# Patient Record
Sex: MALE | Race: WHITE | NOT HISPANIC OR LATINO | Employment: PART TIME | URBAN - METROPOLITAN AREA
[De-identification: names, ages, dates, MRNs, and addresses within clinical notes are randomized per-mention and may not be internally consistent; named-entity substitution may affect disease eponyms.]

---

## 2019-12-05 ENCOUNTER — OFFICE VISIT (OUTPATIENT)
Dept: FAMILY MEDICINE CLINIC | Facility: CLINIC | Age: 39
End: 2019-12-05

## 2019-12-05 VITALS
RESPIRATION RATE: 16 BRPM | SYSTOLIC BLOOD PRESSURE: 126 MMHG | OXYGEN SATURATION: 98 % | BODY MASS INDEX: 35.42 KG/M2 | HEIGHT: 74 IN | HEART RATE: 76 BPM | DIASTOLIC BLOOD PRESSURE: 72 MMHG | WEIGHT: 276 LBS

## 2019-12-05 DIAGNOSIS — Z13.6 ENCOUNTER FOR LIPID SCREENING FOR CARDIOVASCULAR DISEASE: ICD-10-CM

## 2019-12-05 DIAGNOSIS — G89.29 CHRONIC FOOT PAIN, RIGHT: ICD-10-CM

## 2019-12-05 DIAGNOSIS — Z00.00 ROUTINE HEALTH MAINTENANCE: Primary | ICD-10-CM

## 2019-12-05 DIAGNOSIS — E66.09 CLASS 2 OBESITY DUE TO EXCESS CALORIES WITHOUT SERIOUS COMORBIDITY WITH BODY MASS INDEX (BMI) OF 35.0 TO 35.9 IN ADULT: ICD-10-CM

## 2019-12-05 DIAGNOSIS — Z13.220 ENCOUNTER FOR LIPID SCREENING FOR CARDIOVASCULAR DISEASE: ICD-10-CM

## 2019-12-05 DIAGNOSIS — I83.93 ASYMPTOMATIC VARICOSE VEINS OF BOTH LOWER EXTREMITIES: ICD-10-CM

## 2019-12-05 DIAGNOSIS — Z13.1 SCREENING FOR DIABETES MELLITUS (DM): ICD-10-CM

## 2019-12-05 DIAGNOSIS — M79.671 CHRONIC FOOT PAIN, RIGHT: ICD-10-CM

## 2019-12-05 PROCEDURE — 99385 PREV VISIT NEW AGE 18-39: CPT | Performed by: FAMILY MEDICINE

## 2019-12-05 NOTE — PROGRESS NOTES
Ronald Noe is a 39 y.o. male. Pt is a new pt for the clinic and he is here to establish care and have a physical exam done.     Chief Complaint   Patient presents with   • Annual Exam       HPI         The following portions of the patient's history were reviewed and updated as appropriate: allergies, current medications, past family history, past medical history, past social history, past surgical history and problem list.    Review of Systems   Constitutional: Negative for chills, fatigue, fever and unexpected weight change.   HENT: Negative for sore throat.    Respiratory: Negative for cough, shortness of breath and wheezing.    Cardiovascular: Negative for chest pain, palpitations and leg swelling.   Gastrointestinal: Negative for abdominal distention, abdominal pain, constipation, diarrhea, nausea and vomiting.   Genitourinary: Negative for dysuria.   Musculoskeletal: Negative for arthralgias and myalgias.   Skin: Negative for rash.   Neurological: Negative for dizziness.   Psychiatric/Behavioral: Negative for confusion.     I have reviewed the ROS as documented by the MA. Ronald Johnson MD    Objective  Vitals:    12/05/19 1428   BP: 126/72   Pulse: 76   Resp: 16   SpO2: 98%     Body mass index is 35.44 kg/m².    Physical Exam   Constitutional: He is oriented to person, place, and time. He appears well-developed and well-nourished. No distress.   HENT:   Right Ear: External ear normal.   Left Ear: External ear normal.   Eyes: EOM are normal. Pupils are equal, round, and reactive to light.   Neck: Normal range of motion. Neck supple. No thyromegaly present.   Cardiovascular: Normal rate, regular rhythm, normal heart sounds and intact distal pulses.   No murmur heard.  Pulmonary/Chest: Effort normal and breath sounds normal. No respiratory distress. He has no wheezes.   Abdominal: Soft. Bowel sounds are normal. There is no tenderness. There is no rebound and no guarding.   Musculoskeletal:  Normal range of motion.        Right lower leg: He exhibits edema.        Left lower leg: He exhibits edema.        Right foot: There is bony tenderness and deformity.   Prominence of B proximal 5th metatarsal head, R>>L, pain with movement of the lateral arch   Neurological: He is alert and oriented to person, place, and time. He displays normal reflexes. No sensory deficit. He exhibits normal muscle tone.   Skin: Skin is warm and dry.        Multiple varicosities on B medial thighs   Psychiatric: He has a normal mood and affect. His behavior is normal.   Nursing note and vitals reviewed.      No current outpatient medications on file.  Current outpatient and discharge medications have been reconciled for the patient.  Reviewed by: Ronald Johnson MD      Procedures    Lab Results (most recent)     None                  Ronald was seen today for annual exam.    Diagnoses and all orders for this visit:    Routine health maintenance    Encounter for lipid screening for cardiovascular disease  -     Lipid Panel With LDL / HDL Ratio    Screening for diabetes mellitus (DM)  -     Comprehensive Metabolic Panel    Class 2 obesity due to excess calories without serious comorbidity with body mass index (BMI) of 35.0 to 35.9 in adult  -     Comprehensive Metabolic Panel  -     Lipid Panel With LDL / HDL Ratio    Asymptomatic varicose veins of both lower extremities    Chronic foot pain, right  -     Ambulatory Referral to Podiatry    Orders as above. I'll contact him with results when available. Encouraged to sign up for and utilize [employer]. Referral to podiatry for treatment of foot pain as I think he may benefit from custom shoes and/or insoles. May require surgical intervention. For now will work to get new shoes.     Discussed natural history and typical prognosis for varicose veins. No interventions needed at this time as he's asymptomatic.     Any information loaded into the AVS was placed there by PRATIBHA Hart  MD Elizabeth, and patient was counseled on the same.   Return in about 4 weeks (around 1/2/2020).      PRATIBHA Johnson MD    Prevention counseling performed as below:  Discussed dietary options, information given in AVS.

## 2019-12-05 NOTE — PATIENT INSTRUCTIONS

## 2020-03-09 ENCOUNTER — OFFICE VISIT (OUTPATIENT)
Dept: FAMILY MEDICINE CLINIC | Facility: CLINIC | Age: 40
End: 2020-03-09

## 2020-03-09 VITALS
RESPIRATION RATE: 16 BRPM | BODY MASS INDEX: 35.29 KG/M2 | DIASTOLIC BLOOD PRESSURE: 74 MMHG | WEIGHT: 275 LBS | HEIGHT: 74 IN | OXYGEN SATURATION: 98 % | SYSTOLIC BLOOD PRESSURE: 124 MMHG | HEART RATE: 73 BPM

## 2020-03-09 DIAGNOSIS — E66.09 CLASS 2 OBESITY DUE TO EXCESS CALORIES WITHOUT SERIOUS COMORBIDITY WITH BODY MASS INDEX (BMI) OF 35.0 TO 35.9 IN ADULT: ICD-10-CM

## 2020-03-09 DIAGNOSIS — M54.50 CHRONIC BILATERAL LOW BACK PAIN WITHOUT SCIATICA: Primary | ICD-10-CM

## 2020-03-09 DIAGNOSIS — G89.29 CHRONIC BILATERAL LOW BACK PAIN WITHOUT SCIATICA: Primary | ICD-10-CM

## 2020-03-09 PROCEDURE — 99214 OFFICE O/P EST MOD 30 MIN: CPT | Performed by: FAMILY MEDICINE

## 2020-03-09 RX ORDER — DICLOFENAC SODIUM 75 MG/1
75 TABLET, DELAYED RELEASE ORAL 2 TIMES DAILY
COMMUNITY
End: 2020-05-12

## 2020-03-09 NOTE — PATIENT INSTRUCTIONS
Core Strength Exercises    Core exercises help to build strength in the muscles between your ribs and your hips (abdominal muscles). These muscles help to support your body and keep your spine stable. It is important to maintain strength in your core to prevent injury and pain.  Some activities, such as yoga and Pilates, can help to strengthen core muscles. You can also strengthen core muscles with exercises at home. It is important to talk to your health care provider before you start a new exercise routine.  What are the benefits of core strength exercises?  Core strength exercises can:  · Reduce back pain.  · Help to rebuild strength after a back or spine injury.  · Help to prevent injury during physical activity, especially injuries to the back and knees.  How to do core strength exercises  Repeat these exercises 10-15 times, or until you are tired. Do exercises exactly as told by your health care provider and adjust them as directed. It is normal to feel mild stretching, pulling, tightness, or discomfort as you do these exercises. If you feel any pain while doing these exercises, stop. If your pain continues or gets worse when doing core exercises, contact your health care provider.  You may want to use a padded yoga or exercise mat for strength exercises that are done on the floor.  Bridging    1. Lie on your back on a firm surface with your knees bent and your feet flat on the floor.  2. Raise your hips so that your knees, hips, and shoulders form a straight line together. Keep your abdominal muscles tight.  3. Hold this position for 3-5 seconds.  4. Slowly lower your hips to the starting position.  5. Let your muscles relax completely between repetitions.  Single-leg bridge  1. Lie on your back on a firm surface with your knees bent and your feet flat on the floor.  2. Raise your hips so that your knees, hips, and shoulders form a straight line together. Keep your abdominal muscles tight.  3. Lift one foot  off the floor, then completely straighten that leg.  4. Hold this position for 3-5 seconds.  5. Put the straight leg back down in the bent position.  6. Slowly lower your hips to the starting position.  7. Repeat these steps using your other leg.  Side bridge  1. Lie on your side with your knees bent. Prop yourself up on the elbow that is near the floor.  2. Using your abdominal muscles and your elbow that is on the floor, raise your body off the floor. Raise your hip so that your shoulder, hip, and foot form a straight line together.  3. Hold this position for 10 seconds. Keep your head and neck raised and away from your shoulder (in their normal, neutral position). Keep your abdominal muscles tight.  4. Slowly lower your hip to the starting position.  5. Repeat and try to hold this position longer, working your way up to 30 seconds.  Abdominal crunch  1. Lie on your back on a firm surface. Bend your knees and keep your feet flat on the floor.  2. Cross your arms over your chest.  3. Without bending your neck, tip your chin slightly toward your chest.  4. Tighten your abdominal muscles as you lift your chest just high enough to lift your shoulder blades off of the floor. Do not hold your breath. You can do this with short lifts or long lifts.  5. Slowly return to the starting position.  Bird dog  1. Get on your hands and knees, with your legs shoulder-width apart and your arms under your shoulders. Keep your back straight.  2. Tighten your abdominal muscles.  3. Raise one of your legs off the floor and straighten it. Try to keep it parallel to the floor.  4. Slowly lower your leg to the starting position.  5. Raise one of your arms off the floor and straighten it. Try to keep it parallel to the floor.  6. Slowly lower your arm to the starting position.  7. Repeat with the other arm and leg. If possible, try raising a leg and arm at the same time, on opposite sides of the body. For example, raise your left hand and  your right leg.  Plank  1. Lie on your belly.  2. Prop up your body onto your forearms and your feet, keeping your legs straight. Your body should make a straight line between your shoulders and feet.  3. Hold this position for 10 seconds while keeping your abdominal muscles tight.  4. Lower your body to the starting position.  5. Repeat and try to hold this position longer, working your way up to 30 seconds.  Cross-core strengthening  1. Stand with your feet shoulder-width apart.  2. Hold a ball out in front of you. Keep your arms straight.  3. Tighten your abdominal muscles and slowly rotate at your waist from side to side. Keep your feet flat.  4. Once you are comfortable, try repeating this exercise with a heavier ball.  Top core strengthening  1. Stand about 18 inches (46 cm) in front of a wall, with your back to the wall.  2. Keep your feet flat and shoulder-width apart.  3. Tighten your abdominal muscles.  4. Bend your hips and knees.  5. Slowly reach between your legs to touch the wall behind you.  6. Slowly stand back up.  7. Raise your arms over your head and reach behind you.  8. Return to the starting position.  General tips  · Do not do any exercises that cause pain. If you have pain while exercising, talk to your health care provider.  · Always stretch before and after doing these exercises. This can help prevent injury.  · Maintain a healthy weight. Ask your health care provider what weight is healthy for you.  Contact a health care provider if:  · You have back pain that gets worse or does not go away.  · You feel pain while doing core strength exercises.  Get help right away if:  · You have severe pain that does not get better with medicine.  Summary  · Core exercises help to build strength in the muscles between your ribs and your waist.  · Core muscles help to support your body and keep your spine stable.  · Some activities, such as yoga and Pilates, can help to strengthen core muscles.  · Core  strength exercises can help back pain and can prevent injury.  · If you feel any pain while doing core strength exercises, stop.  This information is not intended to replace advice given to you by your health care provider. Make sure you discuss any questions you have with your health care provider.  Document Released: 05/09/2018 Document Revised: 05/09/2018 Document Reviewed: 05/09/2018  [employer] Interactive Patient Education © 2020 [employer]

## 2020-03-09 NOTE — PROGRESS NOTES
Ronald Noe is a 39 y.o. male. Pt is here for feet and back pain.     Chief Complaint   Patient presents with   • Back Pain       HPI     Here with chronic low back pain has been getting progressively worse.  Cannot recall any inciting event, though thinks is related to childcare or housework.  Is a deep pain in the middle of his back, no radiation into his legs.  Bothersome if he has been standing for any length of time, worse with some movements.  Believes it is gotten somewhat worse with weight gain.  Has never tried any formal physical therapy.    Otherwise doing well.  Working on his diet and exercise regimen.  Hopeful to lose some weight and get into better shape overall.    The following portions of the patient's history were reviewed and updated as appropriate: allergies, current medications, past family history, past medical history, past social history, past surgical history and problem list.    Review of Systems   Constitutional: Negative for activity change and fatigue.   Musculoskeletal: Positive for arthralgias, back pain and myalgias. Negative for gait problem, neck pain and neck stiffness.   Psychiatric/Behavioral: The patient is not nervous/anxious.      I have reviewed and confirmed the ROS as documented by the MA. Ronald Johnson MD    Objective  Vitals:    03/09/20 1446   BP: 124/74   Pulse: 73   Resp: 16   SpO2: 98%     Body mass index is 35.31 kg/m².    Physical Exam   Constitutional: He appears well-developed and well-nourished.   Musculoskeletal:        Lumbar back: He exhibits tenderness and spasm. He exhibits no swelling and no deformity.   Nursing note and vitals reviewed.        Current Outpatient Medications:   •  diclofenac (VOLTAREN) 75 MG EC tablet, Take 75 mg by mouth 2 (Two) Times a Day., Disp: , Rfl:   Current outpatient and discharge medications have been reconciled for the patient.  Reviewed by: Ronald Johnson MD      Procedures    Lab Results (most  recent)     None                  Ronald was seen today for back pain.    Diagnoses and all orders for this visit:    Chronic bilateral low back pain without sciatica    Class 2 obesity due to excess calories without serious comorbidity with body mass index (BMI) of 35.0 to 35.9 in adult    Discussed some low back exercises and the likely etiology of his back pain.  I do believe this is related to musculoskeletal spasm, likely due to generalized weakness.  Discussed the importance of core strengthening to help with trunk stability and to get his back rest.  Information provided in his AVS.    Encouraged to continue working on diet and lifestyle.  I do think that any efforts towards weight loss will be helpful with his back pain.    Any information loaded into the AVS was placed there by PRATIBHA Johnson MD, and patient was counseled on the same.   Return in about 3 months (around 6/9/2020), or if symptoms worsen or fail to improve.      PRATIBHA Johnson MD    15 minutes was spent of this 25 minute visit in direct face to face counseling and coordination of care regarding:

## 2020-07-30 ENCOUNTER — OFFICE VISIT (OUTPATIENT)
Dept: FAMILY MEDICINE CLINIC | Facility: CLINIC | Age: 40
End: 2020-07-30

## 2020-07-30 VITALS
SYSTOLIC BLOOD PRESSURE: 130 MMHG | HEART RATE: 72 BPM | BODY MASS INDEX: 36.45 KG/M2 | DIASTOLIC BLOOD PRESSURE: 72 MMHG | RESPIRATION RATE: 16 BRPM | WEIGHT: 284 LBS | TEMPERATURE: 97 F | OXYGEN SATURATION: 98 % | HEIGHT: 74 IN

## 2020-07-30 DIAGNOSIS — I86.1 LEFT VARICOCELE: ICD-10-CM

## 2020-07-30 DIAGNOSIS — S76.012A STRAIN OF FLEXOR MUSCLE OF LEFT HIP, INITIAL ENCOUNTER: Primary | ICD-10-CM

## 2020-07-30 PROCEDURE — 99213 OFFICE O/P EST LOW 20 MIN: CPT | Performed by: FAMILY MEDICINE

## 2020-07-30 RX ORDER — DICLOFENAC SODIUM 75 MG/1
TABLET, DELAYED RELEASE ORAL
COMMUNITY
Start: 2020-07-17 | End: 2022-01-20

## 2020-07-30 RX ORDER — KETOCONAZOLE 20 MG/ML
SHAMPOO TOPICAL
COMMUNITY
Start: 2020-07-10 | End: 2022-01-20

## 2020-07-30 NOTE — PROGRESS NOTES
Ronald Noe is a 39 y.o. male.     Chief Complaint   Patient presents with   • Groin Pain       HPI     Here today with complaint of left hip pain.  Has been ongoing for several weeks now.  Started after being on his feet most the day July 4.  Woke up the next day with difficulty in flexing his left leg at the hip.  Figured it would go away eventually, has been trying to compensate for this.  Finds that his symptoms have not gone away and in fact now seem to involve his groin at times.  Worried that it starting to affect his left knee as well due to changes in his gait for compensation.    Has some vague deep pain in his groin at times, mostly localized around his left testicle.  He is not sure if this is related to the above.  Does note that his left testicle hangs lower than the right and has for some time.  Finds that certain actions seem to precipitate the groin pain, such as crossing his legs.    The following portions of the patient's history were reviewed and updated as appropriate: allergies, current medications, past family history, past medical history, past social history, past surgical history and problem list.    Review of Systems   Respiratory: Negative for chest tightness and shortness of breath.    Cardiovascular: Negative for chest pain and palpitations.   Genitourinary: Positive for testicular pain. Negative for discharge, penile pain, penile swelling and scrotal swelling.   Musculoskeletal: Positive for gait problem and myalgias.     I have reviewed and confirmed the ROS as documented by the MA. Ronald Johnson MD    Objective  Vitals:    07/30/20 1305   BP: 130/72   Pulse: 72   Resp: 16   Temp: 97 °F (36.1 °C)   SpO2: 98%     Body mass index is 36.46 kg/m².    Physical Exam   Constitutional: He appears well-developed and well-nourished. No distress.   Cardiovascular: Normal rate, regular rhythm, normal heart sounds and intact distal pulses.   No murmur heard.  Genitourinary:  Penis normal. Circumcised.   Genitourinary Comments: Left varicocele noted   Musculoskeletal:   No tenderness over the left hip, though does have some tenderness over the anterior inferior iliac spine, and pain with resisted hip flexion.  Slightly decreased range of motion in hip flexion due to pain.  Strength in the lower leg is intact.   Nursing note and vitals reviewed.        Current Outpatient Medications:   •  diclofenac (VOLTAREN) 75 MG EC tablet, , Disp: , Rfl:   •  ketoconazole (NIZORAL) 2 % shampoo, , Disp: , Rfl:   Current outpatient and discharge medications have been reconciled for the patient.  Reviewed by: Ronald Johnson MD      Procedures    Lab Results (most recent)     None                  Ronald was seen today for groin pain.    Diagnoses and all orders for this visit:    Strain of flexor muscle of left hip, initial encounter  -     Ambulatory Referral to Physical Therapy Evaluate and treat; Stretching, Strengthening    Left varicocele    Will refer to physical therapy for work on stretching and strengthening of the left hip.  I do think some rebalancing exercises will help eventually.  Can take over-the-counter pain meds as needed.    Discussed nature and prognosis of a varicocele at length.  Recommended a referral to urology if the pain bothers him greatly.  At present he will continue to monitor.    Any information loaded into the AVS was placed there by PRATIBHA Johnson MD, and patient was counseled on the same.   No follow-ups on file.      PRATIBHA Johnson MD

## 2021-01-13 ENCOUNTER — TELEMEDICINE (OUTPATIENT)
Dept: FAMILY MEDICINE CLINIC | Facility: CLINIC | Age: 41
End: 2021-01-13

## 2021-01-13 DIAGNOSIS — U07.1 COVID-19: Primary | ICD-10-CM

## 2021-01-13 PROCEDURE — 99213 OFFICE O/P EST LOW 20 MIN: CPT | Performed by: NURSE PRACTITIONER

## 2021-01-13 NOTE — PROGRESS NOTES
Subjective   Ronald Noe is a 40 y.o. male.   televisit  History of Present Illness   Pt works at [employer] and his wife got exposed to Covid on December 28th.She got tested on the 6th and it negative and then she was tested on the 11th and it was positive.She never had any symptoms.  Pt does not have any symptoms.  The following portions of the patient's history were reviewed and updated as appropriate: allergies, current medications, past family history, past medical history, past social history, past surgical history and problem list.    Review of Systems   Constitutional: Negative for activity change, appetite change, fatigue and fever.   HENT: Negative for congestion, ear pain, rhinorrhea, sinus pressure and sinus pain.    Eyes: Negative for discharge.   Respiratory: Negative for cough, choking and shortness of breath.    Cardiovascular: Negative for chest pain.   Gastrointestinal: Negative for nausea.   Neurological: Negative for dizziness and headaches.       Objective   Physical Exam  Neurological:      Mental Status: He is alert.   Psychiatric:         Mood and Affect: Mood normal.         Assessment/Plan   Diagnoses and all orders for this visit:    1. COVID-19 (Primary)  -     COVID-19,[facility] ROUTINE, NP/OP SWAB IN TRANSPORT MEDIA OR ESWAB 72 HR TAT - Swab, Oropharynx; Future  -     COVID-19,[facility] ROUTINE, NP/OP SWAB IN TRANSPORT MEDIA OR ESWAB 72 HR TAT - Swab, Oropharynx    You have chosen to receive care through a telephone visit. Do you consent to use a telephone visit for your medical care today? Yes  Spent 15 minutes via telephone discussing his current complaints, exposures and our plan of care.

## 2021-01-14 NOTE — PATIENT INSTRUCTIONS
COVID-19  COVID-19 is a respiratory infection that is caused by a virus called severe acute respiratory syndrome coronavirus 2 (SARS-CoV-2). The disease is also known as coronavirus disease or novel coronavirus. In some people, the virus may not cause any symptoms. In others, it may cause a serious infection. The infection can get worse quickly and can lead to complications, such as:  · Pneumonia, or infection of the lungs.  · Acute respiratory distress syndrome or ARDS. This is a condition in which fluid build-up in the lungs prevents the lungs from filling with air and passing oxygen into the blood.  · Acute respiratory failure. This is a condition in which there is not enough oxygen passing from the lungs to the body or when carbon dioxide is not passing from the lungs out of the body.  · Sepsis or septic shock. This is a serious bodily reaction to an infection.  · Blood clotting problems.  · Secondary infections due to bacteria or fungus.  · Organ failure. This is when your body's organs stop working.  The virus that causes COVID-19 is contagious. This means that it can spread from person to person through droplets from coughs and sneezes (respiratory secretions).  What are the causes?  This illness is caused by a virus. You may catch the virus by:  · Breathing in droplets from an infected person. Droplets can be spread by a person breathing, speaking, singing, coughing, or sneezing.  · Touching something, like a table or a doorknob, that was exposed to the virus (contaminated) and then touching your mouth, nose, or eyes.  What increases the risk?  Risk for infection  You are more likely to be infected with this virus if you:  · Are within 6 feet (2 meters) of a person with COVID-19.  · Provide care for or live with a person who is infected with COVID-19.  · Spend time in crowded indoor spaces or live in shared housing.  Risk for serious illness  You are more likely to become seriously ill from the virus if  "you:  · Are 50 years of age or older. The higher your age, the more you are at risk for serious illness.  · Live in a nursing home or long-term care facility.  · Have cancer.  · Have a long-term (chronic) disease such as:  ? Chronic lung disease, including chronic obstructive pulmonary disease or asthma.  ? A long-term disease that lowers your body's ability to fight infection (immunocompromised).  ? Heart disease, including heart failure, a condition in which the arteries that lead to the heart become narrow or blocked (coronary artery disease), a disease which makes the heart muscle thick, weak, or stiff (cardiomyopathy).  ? Diabetes.  ? Chronic kidney disease.  ? Sickle cell disease, a condition in which red blood cells have an abnormal \"sickle\" shape.  ? Liver disease.  · Are obese.  What are the signs or symptoms?  Symptoms of this condition can range from mild to severe. Symptoms may appear any time from 2 to 14 days after being exposed to the virus. They include:  · A fever or chills.  · A cough.  · Difficulty breathing.  · Headaches, body aches, or muscle aches.  · Runny or stuffy (congested) nose.  · A sore throat.  · New loss of taste or smell.  Some people may also have stomach problems, such as nausea, vomiting, or diarrhea.  Other people may not have any symptoms of COVID-19.  How is this diagnosed?  This condition may be diagnosed based on:  · Your signs and symptoms, especially if:  ? You live in an area with a COVID-19 outbreak.  ? You recently traveled to or from an area where the virus is common.  ? You provide care for or live with a person who was diagnosed with COVID-19.  ? You were exposed to a person who was diagnosed with COVID-19.  · A physical exam.  · Lab tests, which may include:  ? Taking a sample of fluid from the back of your nose and throat (nasopharyngeal fluid), your nose, or your throat using a swab.  ? A sample of mucus from your lungs (sputum).  ? Blood tests.  · Imaging tests, " which may include, X-rays, CT scan, or ultrasound.  How is this treated?  At present, there is no medicine to treat COVID-19. Medicines that treat other diseases are being used on a trial basis to see if they are effective against COVID-19.  Your health care provider will talk with you about ways to treat your symptoms. For most people, the infection is mild and can be managed at home with rest, fluids, and over-the-counter medicines.  Treatment for a serious infection usually takes places in a hospital intensive care unit (ICU). It may include one or more of the following treatments. These treatments are given until your symptoms improve.  · Receiving fluids and medicines through an IV.  · Supplemental oxygen. Extra oxygen is given through a tube in the nose, a face mask, or a sheehan.  · Positioning you to lie on your stomach (prone position). This makes it easier for oxygen to get into the lungs.  · Continuous positive airway pressure (CPAP) or bi-level positive airway pressure (BPAP) machine. This treatment uses mild air pressure to keep the airways open. A tube that is connected to a motor delivers oxygen to the body.  · Ventilator. This treatment moves air into and out of the lungs by using a tube that is placed in your windpipe.  · Tracheostomy. This is a procedure to create a hole in the neck so that a breathing tube can be inserted.  · Extracorporeal membrane oxygenation (ECMO). This procedure gives the lungs a chance to recover by taking over the functions of the heart and lungs. It supplies oxygen to the body and removes carbon dioxide.  Follow these instructions at home:  Lifestyle  · If you are sick, stay home except to get medical care. Your health care provider will tell you how long to stay home. Call your health care provider before you go for medical care.  · Rest at home as told by your health care provider.  · Do not use any products that contain nicotine or tobacco, such as cigarettes,  e-cigarettes, and chewing tobacco. If you need help quitting, ask your health care provider.  · Return to your normal activities as told by your health care provider. Ask your health care provider what activities are safe for you.  General instructions  · Take over-the-counter and prescription medicines only as told by your health care provider.  · Drink enough fluid to keep your urine pale yellow.  · Keep all follow-up visits as told by your health care provider. This is important.  How is this prevented?    There is no vaccine to help prevent COVID-19 infection. However, there are steps you can take to protect yourself and others from this virus.  To protect yourself:   · Do not travel to areas where COVID-19 is a risk. The areas where COVID-19 is reported change often. To identify high-risk areas and travel restrictions, check the CDC travel website: wwwnc.cdc.gov/travel/notices  · If you live in, or must travel to, an area where COVID-19 is a risk, take precautions to avoid infection.  ? Stay away from people who are sick.  ? Wash your hands often with soap and water for 20 seconds. If soap and water are not available, use an alcohol-based hand .  ? Avoid touching your mouth, face, eyes, or nose.  ? Avoid going out in public, follow guidance from your state and local health authorities.  ? If you must go out in public, wear a cloth face covering or face mask. Make sure your mask covers your nose and mouth.  ? Avoid crowded indoor spaces. Stay at least 6 feet (2 meters) away from others.  ? Disinfect objects and surfaces that are frequently touched every day. This may include:  § Counters and tables.  § Doorknobs and light switches.  § Sinks and faucets.  § Electronics, such as phones, remote controls, keyboards, computers, and tablets.  To protect others:  If you have symptoms of COVID-19, take steps to prevent the virus from spreading to others.  · If you think you have a COVID-19 infection, contact  your health care provider right away. Tell your health care team that you think you may have a COVID-19 infection.  · Stay home. Leave your house only to seek medical care. Do not use public transport.  · Do not travel while you are sick.  · Wash your hands often with soap and water for 20 seconds. If soap and water are not available, use alcohol-based hand .  · Stay away from other members of your household. Let healthy household members care for children and pets, if possible. If you have to care for children or pets, wash your hands often and wear a mask. If possible, stay in your own room, separate from others. Use a different bathroom.  · Make sure that all people in your household wash their hands well and often.  · Cough or sneeze into a tissue or your sleeve or elbow. Do not cough or sneeze into your hand or into the air.  · Wear a cloth face covering or face mask. Make sure your mask covers your nose and mouth.  Where to find more information  · Centers for Disease Control and Prevention: www.cdc.gov/coronavirus/2019-ncov/index.html  · World Health Organization: www.who.int/health-topics/coronavirus  Contact a health care provider if:  · You live in or have traveled to an area where COVID-19 is a risk and you have symptoms of the infection.  · You have had contact with someone who has COVID-19 and you have symptoms of the infection.  Get help right away if:  · You have trouble breathing.  · You have pain or pressure in your chest.  · You have confusion.  · You have bluish lips and fingernails.  · You have difficulty waking from sleep.  · You have symptoms that get worse.  These symptoms may represent a serious problem that is an emergency. Do not wait to see if the symptoms will go away. Get medical help right away. Call your local emergency services (911 in the [place] Do not drive yourself to the hospital. Let the emergency medical personnel know if you think you have  COVID-19.  Summary  · COVID-19 is a respiratory infection that is caused by a virus. It is also known as coronavirus disease or novel coronavirus. It can cause serious infections, such as pneumonia, acute respiratory distress syndrome, acute respiratory failure, or sepsis.  · The virus that causes COVID-19 is contagious. This means that it can spread from person to person through droplets from breathing, speaking, singing, coughing, or sneezing.  · You are more likely to develop a serious illness if you are 50 years of age or older, have a weak immune system, live in a nursing home, or have chronic disease.  · There is no medicine to treat COVID-19. Your health care provider will talk with you about ways to treat your symptoms.  · Take steps to protect yourself and others from infection. Wash your hands often and disinfect objects and surfaces that are frequently touched every day. Stay away from people who are sick and wear a mask if you are sick.  This information is not intended to replace advice given to you by your health care provider. Make sure you discuss any questions you have with your health care provider.  Document Revised: 10/16/2020 Document Reviewed: 01/23/2020  Elsevier Patient Education © 2020 [employer]

## 2021-01-15 LAB — SARS-COV-2 RNA RESP QL NAA+PROBE: NOT DETECTED

## 2021-05-29 ENCOUNTER — TELEPHONE (OUTPATIENT)
Dept: URGENT CARE | Facility: CLINIC | Age: 41
End: 2021-05-29

## 2022-01-14 ENCOUNTER — CLINICAL SUPPORT (OUTPATIENT)
Dept: FAMILY MEDICINE CLINIC | Facility: CLINIC | Age: 42
End: 2022-01-14

## 2022-01-14 DIAGNOSIS — Z20.822 CLOSE EXPOSURE TO COVID-19 VIRUS: Primary | ICD-10-CM

## 2022-01-18 LAB — SARS-COV-2 RNA RESP QL NAA+PROBE: NOT DETECTED

## 2022-01-20 ENCOUNTER — TELEMEDICINE (OUTPATIENT)
Dept: FAMILY MEDICINE CLINIC | Facility: CLINIC | Age: 42
End: 2022-01-20

## 2022-01-20 VITALS
BODY MASS INDEX: 36.83 KG/M2 | HEIGHT: 74 IN | SYSTOLIC BLOOD PRESSURE: 138 MMHG | HEART RATE: 73 BPM | OXYGEN SATURATION: 98 % | RESPIRATION RATE: 16 BRPM | DIASTOLIC BLOOD PRESSURE: 92 MMHG | WEIGHT: 287 LBS

## 2022-01-20 DIAGNOSIS — M54.50 CHRONIC BILATERAL LOW BACK PAIN WITHOUT SCIATICA: ICD-10-CM

## 2022-01-20 DIAGNOSIS — R53.81 MALAISE AND FATIGUE: Primary | ICD-10-CM

## 2022-01-20 DIAGNOSIS — R53.83 MALAISE AND FATIGUE: Primary | ICD-10-CM

## 2022-01-20 DIAGNOSIS — G89.29 CHRONIC BILATERAL LOW BACK PAIN WITHOUT SCIATICA: ICD-10-CM

## 2022-01-20 DIAGNOSIS — Z28.21 COVID-19 VACCINATION DECLINED: ICD-10-CM

## 2022-01-20 DIAGNOSIS — B37.49 YEAST DERMATITIS OF PENIS: ICD-10-CM

## 2022-01-20 LAB
EXPIRATION DATE: NORMAL
FLUAV AG NPH QL: NEGATIVE
FLUBV AG NPH QL: NEGATIVE
INTERNAL CONTROL: NORMAL
Lab: NORMAL

## 2022-01-20 PROCEDURE — 87804 INFLUENZA ASSAY W/OPTIC: CPT | Performed by: FAMILY MEDICINE

## 2022-01-20 PROCEDURE — 99214 OFFICE O/P EST MOD 30 MIN: CPT | Performed by: FAMILY MEDICINE

## 2022-01-20 RX ORDER — FLUCONAZOLE 150 MG/1
150 TABLET ORAL ONCE
Qty: 2 TABLET | Refills: 1 | Status: SHIPPED | OUTPATIENT
Start: 2022-01-20 | End: 2022-01-20

## 2022-01-20 NOTE — PROGRESS NOTES
"Chief Complaint  Back Pain (since november), Shortness of Breath (since jan 8th), and Fatigue    Subjective    History of Present Illness {CC  Problem List  Visit  Diagnosis   Encounters  Notes  Medications  Labs  Result Review Imaging  Media :23}     Ronald Noe presents to [facility] for Back Pain (since november), Shortness of Breath (since jan 8th), and Fatigue.  History of Present Illness     Here with concerns as above. Has been feeling ill since Jan 8th, has had two negative COVID tests during that time. Hasn't been tested for flu. Hasn't had any COVID vaccines. No known contacts, but has certainly been around other people. Feels mostly fatigue and shortness of breath.     Also worried about possible yeast balanitis. Seems as if he and his wife \"keep trading yeast infections.\" She has tried over-the-counter creams without any lasting relief. He has noticed some mild skin changes around the head of his penis with some mild skin flaking and itching.    Has some ongoing low back pain that is somewhat worse of late. Does for amount of lifting with his job, acknowledges that this is likely flared it. Takes over-the-counter pain medications with some improvement.    Objective     Vital Signs:   /92   Pulse 73   Resp 16   Ht 188 cm (74\")   Wt 130 kg (287 lb)   SpO2 98%   BMI 36.85 kg/m²   Physical Exam  Vitals and nursing note reviewed.   Constitutional:       General: He is not in acute distress.     Appearance: Normal appearance. He is not ill-appearing.   HENT:      Right Ear: Hearing, tympanic membrane, ear canal and external ear normal.      Left Ear: Hearing, tympanic membrane, ear canal and external ear normal.      Nose: Mucosal edema and congestion present.      Mouth/Throat:      Lips: Pink.      Pharynx: Posterior oropharyngeal erythema present. No pharyngeal swelling or oropharyngeal exudate.      Tonsils: No tonsillar exudate or tonsillar " abscesses.   Cardiovascular:      Rate and Rhythm: Normal rate and regular rhythm.      Pulses: Normal pulses.      Heart sounds: Normal heart sounds. No murmur heard.      Pulmonary:      Effort: Pulmonary effort is normal. No respiratory distress.      Breath sounds: Normal breath sounds. No rales.   Neurological:      Mental Status: He is alert and oriented to person, place, and time. Mental status is at baseline.   Psychiatric:         Mood and Affect: Mood normal.         Behavior: Behavior normal.          Result Review  Data Reviewed:{ Labs  Result Review  Imaging  Med Tab  Media :23}                   Assessment and Plan {CC Problem List  Visit Diagnosis  ROS  Review (Popup)  Health Maintenance  Quality  BestPractice  Medications  SmartSets  SnapShot Encounters  Media :23}   Diagnoses and all orders for this visit:    1. Malaise and fatigue (Primary)  -     COVID-19,[facility] ROUTINE, NP/OP SWAB IN TRANSPORT MEDIA OR ESWAB 72 HR TAT - Swab, Anterior nasal  -     POCT Influenza A/B    2. Yeast dermatitis of penis  -     fluconazole (DIFLUCAN) 150 MG tablet; Take 1 tablet by mouth 1 (One) Time for 1 dose. May repeat in 1 week as needed.  Dispense: 2 tablet; Refill: 1    3. Chronic bilateral low back pain without sciatica    4. COVID-19 vaccination declined    Orders as above. I will contact him with results as available.    We will try some fluconazole for possible yeast dermatitis.    Recommended core exercises to help with low back pain.    Counseled on recommendation for COVID-19 vaccines. He politely declines again today.    Recommended follow-up as below. Encouraged communication via [place] in the meantime.      Follow Up {Instructions Charge Capture  Follow-up Communications :23}     Patient was given instructions and counseling regarding his condition or for health maintenance advice. Please see specific information pulled into the AVS (placed there by myself) if appropriate.    Return  in about 2 months (around 3/20/2022), or if symptoms worsen or fail to improve, for Preventive Health Maintenance.      PRATIBHA Johnson MD

## 2022-01-23 LAB
LABCORP SARS-COV-2, NAA 2 DAY TAT: NORMAL
SARS-COV-2 RNA RESP QL NAA+PROBE: DETECTED

## 2022-03-25 NOTE — PATIENT INSTRUCTIONS
6th attempt    Called left message.     Letter sent         Core Strength Exercises    Core exercises help to build strength in the muscles between your ribs and your hips (abdominal muscles). These muscles help to support your body and keep your spine stable. It is important to maintain strength in your core to prevent injury and pain.  Some activities, such as yoga and Pilates, can help to strengthen core muscles. You can also strengthen core muscles with exercises at home. It is important to talk to your health care provider before you start a new exercise routine.  What are the benefits of core strength exercises?  Core strength exercises can:  · Reduce back pain.  · Help to rebuild strength after a back or spine injury.  · Help to prevent injury during physical activity, especially injuries to the back and knees.  How to do core strength exercises  Repeat these exercises 10-15 times, or until you are tired. Do exercises exactly as told by your health care provider and adjust them as directed. It is normal to feel mild stretching, pulling, tightness, or discomfort as you do these exercises. If you feel any pain while doing these exercises, stop. If your pain continues or gets worse when doing core exercises, contact your health care provider.  You may want to use a padded yoga or exercise mat for strength exercises that are done on the floor.  Bridging    1. Lie on your back on a firm surface with your knees bent and your feet flat on the floor.  2. Raise your hips so that your knees, hips, and shoulders form a straight line together. Keep your abdominal muscles tight.  3. Hold this position for 3-5 seconds.  4. Slowly lower your hips to the starting position.  5. Let your muscles relax completely between repetitions.    Single-leg bridge  1. Lie on your back on a firm surface with your knees bent and your feet flat on the floor.  2. Raise your hips so that your knees, hips, and shoulders form a straight line together. Keep your abdominal muscles tight.  3. Lift one foot  off the floor, then completely straighten that leg.  4. Hold this position for 3-5 seconds.  5. Put the straight leg back down in the bent position.  6. Slowly lower your hips to the starting position.  7. Repeat these steps using your other leg.  Side bridge  1. Lie on your side with your knees bent. Prop yourself up on the elbow that is near the floor.  2. Using your abdominal muscles and your elbow that is on the floor, raise your body off the floor. Raise your hip so that your shoulder, hip, and foot form a straight line together.  3. Hold this position for 10 seconds. Keep your head and neck raised and away from your shoulder (in their normal, neutral position). Keep your abdominal muscles tight.  4. Slowly lower your hip to the starting position.  5. Repeat and try to hold this position longer, working your way up to 30 seconds.  Abdominal crunch  1. Lie on your back on a firm surface. Bend your knees and keep your feet flat on the floor.  2. Cross your arms over your chest.  3. Without bending your neck, tip your chin slightly toward your chest.  4. Tighten your abdominal muscles as you lift your chest just high enough to lift your shoulder blades off of the floor. Do not hold your breath. You can do this with short lifts or long lifts.  5. Slowly return to the starting position.  Bird dog  1. Get on your hands and knees, with your legs shoulder-width apart and your arms under your shoulders. Keep your back straight.  2. Tighten your abdominal muscles.  3. Raise one of your legs off the floor and straighten it. Try to keep it parallel to the floor.  4. Slowly lower your leg to the starting position.  5. Raise one of your arms off the floor and straighten it. Try to keep it parallel to the floor.  6. Slowly lower your arm to the starting position.  7. Repeat with the other arm and leg. If possible, try raising a leg and arm at the same time, on opposite sides of the body. For example, raise your left hand and  your right leg.  Plank  1. Lie on your belly.  2. Prop up your body onto your forearms and your feet, keeping your legs straight. Your body should make a straight line between your shoulders and feet.  3. Hold this position for 10 seconds while keeping your abdominal muscles tight.  4. Lower your body to the starting position.  5. Repeat and try to hold this position longer, working your way up to 30 seconds.  Cross-core strengthening  1. Stand with your feet shoulder-width apart.  2. Hold a ball out in front of you. Keep your arms straight.  3. Tighten your abdominal muscles and slowly rotate at your waist from side to side. Keep your feet flat.  4. Once you are comfortable, try repeating this exercise with a heavier ball.  Top core strengthening  1. Stand about 18 inches (46 cm) in front of a wall, with your back to the wall.  2. Keep your feet flat and shoulder-width apart.  3. Tighten your abdominal muscles.  4. Bend your hips and knees.  5. Slowly reach between your legs to touch the wall behind you.  6. Slowly stand back up.  7. Raise your arms over your head and reach behind you.  8. Return to the starting position.  General tips  · Do not do any exercises that cause pain. If you have pain while exercising, talk to your health care provider.  · Always stretch before and after doing these exercises. This can help prevent injury.  · Maintain a healthy weight. Ask your health care provider what weight is healthy for you.  Contact a health care provider if:  · You have back pain that gets worse or does not go away.  · You feel pain while doing core strength exercises.  Get help right away if:  · You have severe pain that does not get better with medicine.  Summary  · Core exercises help to build strength in the muscles between your ribs and your waist.  · Core muscles help to support your body and keep your spine stable.  · Some activities, such as yoga and Pilates, can help to strengthen core muscles.  · Core  strength exercises can help back pain and can prevent injury.  · If you feel any pain while doing core strength exercises, stop.  This information is not intended to replace advice given to you by your health care provider. Make sure you discuss any questions you have with your health care provider.  Document Revised: 04/08/2020 Document Reviewed: 05/09/2018  Elsevier Patient Education © 2021 [employer]

## 2022-06-29 VITALS
OXYGEN SATURATION: 95 % | HEART RATE: 71 BPM | TEMPERATURE: 97.6 F | DIASTOLIC BLOOD PRESSURE: 88 MMHG | RESPIRATION RATE: 17 BRPM | SYSTOLIC BLOOD PRESSURE: 128 MMHG

## 2022-06-29 PROCEDURE — 99283 EMERGENCY DEPT VISIT LOW MDM: CPT

## 2022-06-30 ENCOUNTER — HOSPITAL ENCOUNTER (EMERGENCY)
Facility: HOSPITAL | Age: 42
Discharge: HOME | End: 2022-06-30
Attending: EMERGENCY MEDICINE | Admitting: EMERGENCY MEDICINE

## 2022-06-30 DIAGNOSIS — S05.02XA ABRASION OF LEFT CORNEA, INITIAL ENCOUNTER: Primary | ICD-10-CM

## 2022-06-30 RX ORDER — PROPARACAINE HYDROCHLORIDE 5 MG/ML
2 SOLUTION/ DROPS OPHTHALMIC ONCE
Status: COMPLETED | OUTPATIENT
Start: 2022-06-30 | End: 2022-06-30

## 2022-06-30 RX ORDER — ERYTHROMYCIN 5 MG/G
OINTMENT OPHTHALMIC
Qty: 3.5 G | Refills: 0 | Status: SHIPPED | OUTPATIENT
Start: 2022-06-30 | End: 2022-08-04

## 2022-06-30 RX ADMIN — FLUORESCEIN SODIUM 1 STRIP: 1 STRIP OPHTHALMIC at 00:34

## 2022-06-30 RX ADMIN — PROPARACAINE HYDROCHLORIDE 2 DROP: 5 SOLUTION/ DROPS OPHTHALMIC at 00:34

## 2023-09-17 ENCOUNTER — HOSPITAL ENCOUNTER (EMERGENCY)
Facility: HOSPITAL | Age: 43
Discharge: HOME | End: 2023-09-17
Attending: EMERGENCY MEDICINE | Admitting: EMERGENCY MEDICINE
Payer: COMMERCIAL

## 2023-09-17 ENCOUNTER — APPOINTMENT (OUTPATIENT)
Dept: GENERAL RADIOLOGY | Facility: HOSPITAL | Age: 43
End: 2023-09-17
Payer: COMMERCIAL

## 2023-09-17 ENCOUNTER — APPOINTMENT (OUTPATIENT)
Dept: CT IMAGING | Facility: HOSPITAL | Age: 43
End: 2023-09-17
Payer: COMMERCIAL

## 2023-09-17 VITALS
BODY MASS INDEX: 35.78 KG/M2 | HEART RATE: 60 BPM | HEIGHT: 73 IN | SYSTOLIC BLOOD PRESSURE: 120 MMHG | TEMPERATURE: 97.1 F | OXYGEN SATURATION: 98 % | RESPIRATION RATE: 17 BRPM | DIASTOLIC BLOOD PRESSURE: 79 MMHG | WEIGHT: 270 LBS

## 2023-09-17 DIAGNOSIS — J18.9 PNEUMONIA OF RIGHT LUNG DUE TO INFECTIOUS ORGANISM, UNSPECIFIED PART OF LUNG: Primary | ICD-10-CM

## 2023-09-17 LAB
ALBUMIN SERPL-MCNC: 4.1 G/DL (ref 3.5–5.2)
ALBUMIN/GLOB SERPL: 1.3 G/DL
ALP SERPL-CCNC: 92 U/L (ref 39–117)
ALT SERPL W P-5'-P-CCNC: 18 U/L (ref 1–41)
ANION GAP SERPL CALCULATED.3IONS-SCNC: 8 MMOL/L (ref 5–15)
APTT PPP: 27.9 SECONDS (ref 22.7–35.4)
AST SERPL-CCNC: 18 U/L (ref 1–40)
B PARAPERT DNA SPEC QL NAA+PROBE: NOT DETECTED
B PERT DNA SPEC QL NAA+PROBE: NOT DETECTED
BASOPHILS # BLD AUTO: 0.13 10*3/MM3 (ref 0–0.2)
BASOPHILS NFR BLD AUTO: 1.5 % (ref 0–1.5)
BILIRUB SERPL-MCNC: 1 MG/DL (ref 0–1.2)
BUN SERPL-MCNC: 11 MG/DL (ref 6–20)
BUN/CREAT SERPL: 13.6 (ref 7–25)
C PNEUM DNA NPH QL NAA+NON-PROBE: NOT DETECTED
CALCIUM SPEC-SCNC: 9.3 MG/DL (ref 8.6–10.5)
CHLORIDE SERPL-SCNC: 107 MMOL/L (ref 98–107)
CO2 SERPL-SCNC: 25 MMOL/L (ref 22–29)
CREAT SERPL-MCNC: 0.81 MG/DL (ref 0.76–1.27)
D DIMER PPP FEU-MCNC: 0.87 MCGFEU/ML (ref 0–0.5)
DEPRECATED RDW RBC AUTO: 39.5 FL (ref 37–54)
EGFRCR SERPLBLD CKD-EPI 2021: 113.6 ML/MIN/1.73
EOSINOPHIL # BLD AUTO: 0.27 10*3/MM3 (ref 0–0.4)
EOSINOPHIL NFR BLD AUTO: 3.1 % (ref 0.3–6.2)
ERYTHROCYTE [DISTWIDTH] IN BLOOD BY AUTOMATED COUNT: 12.3 % (ref 12.3–15.4)
FLUAV SUBTYP SPEC NAA+PROBE: NOT DETECTED
FLUBV RNA ISLT QL NAA+PROBE: NOT DETECTED
GLOBULIN UR ELPH-MCNC: 3.1 GM/DL
GLUCOSE SERPL-MCNC: 95 MG/DL (ref 65–99)
HADV DNA SPEC NAA+PROBE: NOT DETECTED
HCOV 229E RNA SPEC QL NAA+PROBE: NOT DETECTED
HCOV HKU1 RNA SPEC QL NAA+PROBE: NOT DETECTED
HCOV NL63 RNA SPEC QL NAA+PROBE: NOT DETECTED
HCOV OC43 RNA SPEC QL NAA+PROBE: NOT DETECTED
HCT VFR BLD AUTO: 47.2 % (ref 37.5–51)
HGB BLD-MCNC: 16.5 G/DL (ref 13–17.7)
HMPV RNA NPH QL NAA+NON-PROBE: NOT DETECTED
HPIV1 RNA ISLT QL NAA+PROBE: NOT DETECTED
HPIV2 RNA SPEC QL NAA+PROBE: NOT DETECTED
HPIV3 RNA NPH QL NAA+PROBE: NOT DETECTED
HPIV4 P GENE NPH QL NAA+PROBE: NOT DETECTED
IMM GRANULOCYTES # BLD AUTO: 0.03 10*3/MM3 (ref 0–0.05)
IMM GRANULOCYTES NFR BLD AUTO: 0.3 % (ref 0–0.5)
INR PPP: 1.05 (ref 0.9–1.1)
LYMPHOCYTES # BLD AUTO: 1.77 10*3/MM3 (ref 0.7–3.1)
LYMPHOCYTES NFR BLD AUTO: 20.6 % (ref 19.6–45.3)
M PNEUMO IGG SER IA-ACNC: NOT DETECTED
MCH RBC QN AUTO: 30.5 PG (ref 26.6–33)
MCHC RBC AUTO-ENTMCNC: 35 G/DL (ref 31.5–35.7)
MCV RBC AUTO: 87.2 FL (ref 79–97)
MONOCYTES # BLD AUTO: 0.74 10*3/MM3 (ref 0.1–0.9)
MONOCYTES NFR BLD AUTO: 8.6 % (ref 5–12)
NEUTROPHILS NFR BLD AUTO: 5.65 10*3/MM3 (ref 1.7–7)
NEUTROPHILS NFR BLD AUTO: 65.9 % (ref 42.7–76)
NRBC BLD AUTO-RTO: 0 /100 WBC (ref 0–0.2)
NT-PROBNP SERPL-MCNC: 65.6 PG/ML (ref 0–450)
PLATELET # BLD AUTO: 228 10*3/MM3 (ref 140–450)
PMV BLD AUTO: 11 FL (ref 6–12)
POTASSIUM SERPL-SCNC: 4.3 MMOL/L (ref 3.5–5.2)
PROT SERPL-MCNC: 7.2 G/DL (ref 6–8.5)
PROTHROMBIN TIME: 13.8 SECONDS (ref 11.7–14.2)
QT INTERVAL: 418 MS
QTC INTERVAL: 442 MS
RBC # BLD AUTO: 5.41 10*6/MM3 (ref 4.14–5.8)
RHINOVIRUS RNA SPEC NAA+PROBE: NOT DETECTED
RSV RNA NPH QL NAA+NON-PROBE: NOT DETECTED
SARS-COV-2 RNA NPH QL NAA+NON-PROBE: NOT DETECTED
SODIUM SERPL-SCNC: 140 MMOL/L (ref 136–145)
TROPONIN T SERPL HS-MCNC: <6 NG/L
WBC NRBC COR # BLD: 8.59 10*3/MM3 (ref 3.4–10.8)

## 2023-09-17 PROCEDURE — 93005 ELECTROCARDIOGRAM TRACING: CPT | Performed by: EMERGENCY MEDICINE

## 2023-09-17 PROCEDURE — 99285 EMERGENCY DEPT VISIT HI MDM: CPT

## 2023-09-17 PROCEDURE — 80053 COMPREHEN METABOLIC PANEL: CPT | Performed by: EMERGENCY MEDICINE

## 2023-09-17 PROCEDURE — 93010 ELECTROCARDIOGRAM REPORT: CPT | Performed by: INTERNAL MEDICINE

## 2023-09-17 PROCEDURE — 85379 FIBRIN DEGRADATION QUANT: CPT | Performed by: EMERGENCY MEDICINE

## 2023-09-17 PROCEDURE — 71045 X-RAY EXAM CHEST 1 VIEW: CPT

## 2023-09-17 PROCEDURE — 85730 THROMBOPLASTIN TIME PARTIAL: CPT | Performed by: EMERGENCY MEDICINE

## 2023-09-17 PROCEDURE — 83880 ASSAY OF NATRIURETIC PEPTIDE: CPT | Performed by: EMERGENCY MEDICINE

## 2023-09-17 PROCEDURE — 71275 CT ANGIOGRAPHY CHEST: CPT

## 2023-09-17 PROCEDURE — 84484 ASSAY OF TROPONIN QUANT: CPT | Performed by: EMERGENCY MEDICINE

## 2023-09-17 PROCEDURE — 85610 PROTHROMBIN TIME: CPT | Performed by: EMERGENCY MEDICINE

## 2023-09-17 PROCEDURE — 85025 COMPLETE CBC W/AUTO DIFF WBC: CPT | Performed by: EMERGENCY MEDICINE

## 2023-09-17 PROCEDURE — 25510000001 IOPAMIDOL PER 1 ML: Performed by: EMERGENCY MEDICINE

## 2023-09-17 PROCEDURE — 0202U NFCT DS 22 TRGT SARS-COV-2: CPT | Performed by: EMERGENCY MEDICINE

## 2023-09-17 RX ORDER — SODIUM CHLORIDE 0.9 % (FLUSH) 0.9 %
10 SYRINGE (ML) INJECTION AS NEEDED
Status: DISCONTINUED | OUTPATIENT
Start: 2023-09-17 | End: 2023-09-17 | Stop reason: HOSPADM

## 2023-09-17 RX ORDER — AZITHROMYCIN 250 MG/1
TABLET, FILM COATED ORAL
Qty: 6 TABLET | Refills: 0 | Status: SHIPPED | OUTPATIENT
Start: 2023-09-17

## 2023-09-17 RX ORDER — CEFUROXIME AXETIL 500 MG/1
500 TABLET ORAL 2 TIMES DAILY
Qty: 14 TABLET | Refills: 0 | Status: SHIPPED | OUTPATIENT
Start: 2023-09-17 | End: 2023-09-24

## 2023-09-17 RX ADMIN — IOPAMIDOL 95 ML: 755 INJECTION, SOLUTION INTRAVENOUS at 11:14

## 2023-09-17 NOTE — ED PROVIDER NOTES
EMERGENCY DEPARTMENT ENCOUNTER    Room Number:  35/35  Date seen:  9/17/2023  PCP: Provider, No Known  Historian(s): Patient      HPI:  Chief Complaint: Chest pain, shortness of breath, fatigue, congestion  A complete HPI/ROS/PMH/PSH/SH/FH are unobtainable / limited due to: None  Context: Ronald Noe is a 42 y.o. male who presents to the ED c/o multiple symptoms for the past 3 weeks.  Patient says that 3 weeks ago he had an encounter with a cat and he is highly allergic to cats so he thought perhaps that had set him off into an allergic response.  Subsequently he developed a lot of cough and congestion and sinus drainage problems that was worrisome for possible COVID.  He went to an urgent care center and was tested but found to be negative for COVID-19.  Since that time he has had increasing shortness of breath with some chest pains now and generalized fatigue and malaise symptoms.  He said he wanted to come here to be checked out instead because he felt like he should be getting better right now.  He is not a smoker.  He has no underlying lung disease history apart from a traumatic pneumothorax in 2008.        PAST MEDICAL HISTORY  Active Ambulatory Problems     Diagnosis Date Noted    Asymptomatic varicose veins of both lower extremities 12/05/2019    Class 2 obesity due to excess calories without serious comorbidity with body mass index (BMI) of 35.0 to 35.9 in adult 12/05/2019    Chronic foot pain, right 12/05/2019    Chronic bilateral low back pain without sciatica 03/09/2020    COVID-19 vaccination declined 01/20/2022     Resolved Ambulatory Problems     Diagnosis Date Noted    No Resolved Ambulatory Problems     Past Medical History:   Diagnosis Date    Collapsed lung     Pneumothorax 2008    Psoriasis          PAST SURGICAL HISTORY  Past Surgical History:   Procedure Laterality Date    FOOT SURGERY Right     INGUINAL HERNIA REPAIR      INGUINAL HERNIA REPAIR      KNEE MENISCAL REPAIR Right      TONSILLECTOMY           FAMILY HISTORY  Family History   Adopted: Yes   Problem Relation Age of Onset    No Known Problems Mother     No Known Problems Father     No Known Problems Sister     No Known Problems Brother     No Known Problems Son     No Known Problems Daughter     No Known Problems Maternal Grandmother     No Known Problems Maternal Grandfather     No Known Problems Paternal Grandmother     No Known Problems Paternal Grandfather     No Known Problems Cousin     No Known Problems Other          SOCIAL HISTORY  Social History     Socioeconomic History    Marital status:    Tobacco Use    Smoking status: Former     Packs/day: 1.00     Years: 19.00     Pack years: 19.00     Types: Cigarettes     Quit date: 2013     Years since quitting: 10.7    Smokeless tobacco: Never   Vaping Use    Vaping Use: Never used   Substance and Sexual Activity    Alcohol use: No    Drug use: Not Currently     Types: Marijuana    Sexual activity: Yes     Partners: Female     Birth control/protection: None         ALLERGIES  Patient has no known allergies.        REVIEW OF SYSTEMS  Review of Systems   Constitutional:  Positive for fatigue. Negative for activity change and fever.   HENT:  Positive for congestion.    Eyes:  Negative for pain and visual disturbance.   Respiratory:  Positive for cough and shortness of breath.    Cardiovascular:  Positive for chest pain.   Gastrointestinal:  Negative for abdominal pain and vomiting.   Genitourinary:  Negative for dysuria.   Musculoskeletal:  Positive for myalgias.   Skin:  Negative for color change.   Neurological:  Negative for syncope and headaches.   All other systems reviewed and are negative.         PHYSICAL EXAM  ED Triage Vitals   Temp Heart Rate Resp BP SpO2   09/17/23 0908 09/17/23 0908 09/17/23 0908 09/17/23 0918 09/17/23 0908   97.1 °F (36.2 °C) 112 18 117/91 92 %      Temp src Heart Rate Source Patient Position BP Location FiO2 (%)   09/17/23 0908 09/17/23 0908 --  -- --   Tympanic Monitor          Physical Exam      GENERAL: Calm, no diaphoresis, no acute distress  HENT: nares patent, normocephalic and atraumatic  EYES: no scleral icterus, EOMI, normal conjunctivae  CV: regular rhythm, normal rate, normal distal pulses  RESPIRATORY: normal effort, no stridor, diminished at the bases with very few crackles bilaterally but no coughing and no wheezes.  ABDOMEN: soft, nontender throughout  MUSCULOSKELETAL: no deformity, no asymmetry  NEURO: alert, moves all extremities, follows commands  PSYCH:  calm, cooperative  SKIN: warm, dry    Vital signs and nursing notes reviewed.        LAB RESULTS  Recent Results (from the past 24 hour(s))   ECG 12 Lead Chest Pain    Collection Time: 09/17/23  9:11 AM   Result Value Ref Range    QT Interval 418 ms    QTC Interval 442 ms   Comprehensive Metabolic Panel    Collection Time: 09/17/23  9:29 AM    Specimen: Blood   Result Value Ref Range    Glucose 95 65 - 99 mg/dL    BUN 11 6 - 20 mg/dL    Creatinine 0.81 0.76 - 1.27 mg/dL    Sodium 140 136 - 145 mmol/L    Potassium 4.3 3.5 - 5.2 mmol/L    Chloride 107 98 - 107 mmol/L    CO2 25.0 22.0 - 29.0 mmol/L    Calcium 9.3 8.6 - 10.5 mg/dL    Total Protein 7.2 6.0 - 8.5 g/dL    Albumin 4.1 3.5 - 5.2 g/dL    ALT (SGPT) 18 1 - 41 U/L    AST (SGOT) 18 1 - 40 U/L    Alkaline Phosphatase 92 39 - 117 U/L    Total Bilirubin 1.0 0.0 - 1.2 mg/dL    Globulin 3.1 gm/dL    A/G Ratio 1.3 g/dL    BUN/Creatinine Ratio 13.6 7.0 - 25.0    Anion Gap 8.0 5.0 - 15.0 mmol/L    eGFR 113.6 >60.0 mL/min/1.73   Protime-INR    Collection Time: 09/17/23  9:29 AM    Specimen: Blood   Result Value Ref Range    Protime 13.8 11.7 - 14.2 Seconds    INR 1.05 0.90 - 1.10   aPTT    Collection Time: 09/17/23  9:29 AM    Specimen: Blood   Result Value Ref Range    PTT 27.9 22.7 - 35.4 seconds   Single High Sensitivity Troponin T    Collection Time: 09/17/23  9:29 AM    Specimen: Blood   Result Value Ref Range    HS Troponin T <6 <15 ng/L    BNP    Collection Time: 09/17/23  9:29 AM    Specimen: Blood   Result Value Ref Range    proBNP 65.6 0.0 - 450.0 pg/mL   CBC Auto Differential    Collection Time: 09/17/23  9:29 AM    Specimen: Blood   Result Value Ref Range    WBC 8.59 3.40 - 10.80 10*3/mm3    RBC 5.41 4.14 - 5.80 10*6/mm3    Hemoglobin 16.5 13.0 - 17.7 g/dL    Hematocrit 47.2 37.5 - 51.0 %    MCV 87.2 79.0 - 97.0 fL    MCH 30.5 26.6 - 33.0 pg    MCHC 35.0 31.5 - 35.7 g/dL    RDW 12.3 12.3 - 15.4 %    RDW-SD 39.5 37.0 - 54.0 fl    MPV 11.0 6.0 - 12.0 fL    Platelets 228 140 - 450 10*3/mm3    Neutrophil % 65.9 42.7 - 76.0 %    Lymphocyte % 20.6 19.6 - 45.3 %    Monocyte % 8.6 5.0 - 12.0 %    Eosinophil % 3.1 0.3 - 6.2 %    Basophil % 1.5 0.0 - 1.5 %    Immature Grans % 0.3 0.0 - 0.5 %    Neutrophils, Absolute 5.65 1.70 - 7.00 10*3/mm3    Lymphocytes, Absolute 1.77 0.70 - 3.10 10*3/mm3    Monocytes, Absolute 0.74 0.10 - 0.90 10*3/mm3    Eosinophils, Absolute 0.27 0.00 - 0.40 10*3/mm3    Basophils, Absolute 0.13 0.00 - 0.20 10*3/mm3    Immature Grans, Absolute 0.03 0.00 - 0.05 10*3/mm3    nRBC 0.0 0.0 - 0.2 /100 WBC   D-dimer, Quantitative    Collection Time: 09/17/23  9:29 AM    Specimen: Blood   Result Value Ref Range    D-Dimer, Quantitative 0.87 (H) 0.00 - 0.50 MCGFEU/mL   Respiratory Panel PCR w/COVID-19(SARS-CoV-2) [facility]/S[facility]/DORINA/[facility]/LYNDSAY In-House, NP Swab in UTM/VTM, 3-4 HR TAT - Swab, Nasopharynx    Collection Time: 09/17/23  9:30 AM    Specimen: Nasopharynx; Swab   Result Value Ref Range    ADENOVIRUS, PCR Not Detected Not Detected    Coronavirus 229E Not Detected Not Detected    Coronavirus HKU1 Not Detected Not Detected    Coronavirus NL63 Not Detected Not Detected    Coronavirus OC43 Not Detected Not Detected    COVID19 Not Detected Not Detected - Ref. Range    Human Metapneumovirus Not Detected Not Detected    Human Rhinovirus/Enterovirus Not Detected Not Detected    Influenza A PCR Not Detected Not Detected    Influenza B PCR Not  Detected Not Detected    Parainfluenza Virus 1 Not Detected Not Detected    Parainfluenza Virus 2 Not Detected Not Detected    Parainfluenza Virus 3 Not Detected Not Detected    Parainfluenza Virus 4 Not Detected Not Detected    RSV, PCR Not Detected Not Detected    Bordetella pertussis pcr Not Detected Not Detected    Bordetella parapertussis PCR Not Detected Not Detected    Chlamydophila pneumoniae PCR Not Detected Not Detected    Mycoplasma pneumo by PCR Not Detected Not Detected       Ordered the above labs and reviewed the results.        RADIOLOGY  XR Chest 1 View    Result Date: 9/17/2023  ONE-VIEW PORTABLE CHEST  HISTORY: Chest pain.  FINDINGS: The lungs are well expanded and clear except for calcified granuloma at the left base. The heart hilar structures are normal. There is no acute disease.  This report was finalized on 9/17/2023 9:34 AM by Dr. Rick Roger M.D.      CT Angiogram Chest    Result Date: 9/17/2023  CT ANGIOGRAPHY OF THE CHEST WITH INTRAVENOUS CONTRAST AND 3D RECONSTRUCTIONS  HISTORY: Chest pain and cough. Elevated D-dimer.  The CT scan was performed as an emergency procedure through the chest with CT angiography protocol using intravenous contrast and 3D reconstructions. The following findings are present: 1. The pulmonary arteries are well-opacified and there is no evidence of pulmonary embolus. The thoracic aorta shows no evidence of aneurysm or dissection.  2. There is a somewhat diffuse reticular nodular haziness scattered throughout the right lower lobe and highly suspicious for atypical pneumonia and further clinical correlation is recommended. This is associated with an enlarged right hilar lymph node measuring up to 2.3 cm that is nonspecific but likely reactive in this setting. There is no axillary adenopathy. A follow-up CT scan in 3 to 4 months might be considered to ensure appropriate resolution of these findings.  3. There is no pericardial effusion. The CT images through the  upper liver, spleen, and both adrenal glands are unremarkable.      Radiation dose reduction techniques were utilized, including automated exposure control and exposure modulation based on body size.   This report was finalized on 9/17/2023 11:35 AM by Dr. Rick Roger M.D.       Ordered the above noted radiological studies. Reviewed by me in PACS.        PROCEDURES  Procedures      EKG           EKG time/Interp time: 0911/0915  Rhythm/Rate: Sinus rhythm, 67 bpm  P waves and MA: Present, 129 ms  QRS, axis: 96 ms, normal axis  ST and T waves: No ST segment elevations present.    Independently interpreted by me contemporaneously with treatment    MEDICATIONS GIVEN IN ER  Medications   iopamidol (ISOVUE-370) 76 % injection 100 mL (95 mL Intravenous Given by Other 9/17/23 1114)       MEDICAL DECISION MAKING, PROGRESS, and CONSULTS    All labs have been independently reviewed by me.  All radiology studies have been reviewed by me and I have also reviewed the radiology report.   EKG's independently viewed and interpreted by me.  Discussion below represents my analysis of pertinent findings related to patient's condition, differential diagnosis, treatment plan and final disposition.    Heart score: 2    Additional sources:  - Discussed/ obtained information from independent historians: None    - External (non-ED) record review: I reviewed the most recent PCP progress note on file from January 13, 2021.  At that time, patient was evaluated for possible COVID-19 diagnosis and had testing.    - Chronic or social conditions impacting care: Patient indicates that he is under a lot of stress right now and expresses concern about needing to help care for his 4 children.          Orders placed during this visit:  Orders Placed This Encounter   Procedures    Respiratory Panel PCR w/COVID-19(SARS-CoV-2) [facility]/S[facility]/D[facility]INA/[facility]/LYNDSAY In-House, NP Swab in UTM/VTM, 3-4 HR TAT - Swab, Nasopharynx    XR Chest 1 View    CT Angiogram Chest     Comprehensive Metabolic Panel    Protime-INR    aPTT    Single High Sensitivity Troponin T    BNP    CBC Auto Differential    D-dimer, Quantitative    Monitor Blood Pressure    Pulse Oximetry, Continuous    ECG 12 Lead Chest Pain    CBC & Differential           Differential diagnosis includes but is not limited to:    Acute MI, pneumothorax, pneumonia, pulmonary embolism, GERD, viral illness      Independent interpretation of labs, radiology studies, and discussions with consultants:  ED Course as of 09/17/23 1452   Sun Sep 17, 2023   0929 Overall nontoxic-appearing.  Heart rate is in the 60-70 range.  Work of breathing is normal.  O2 sats are 92 to 93% on room air right now.  We will continue to monitor carefully.  Proceed with typical chest pain and pulmonary work-up. [PRATIK]   1031 I independently interpreted the Chest X-ray and my findings are: No Pneumothorax, No Effusion, No Infiltrate   [PRATIK]   1227 I discussed with the radiologist, Dr. Roger, about the patient's CT scan.  He says there is no pulmonary embolus present.  However there are findings suggestive of atypical pneumonia in the right lower lobe. [PRATIK]   1445 Reviewed results w/ pt at the bedside.  Appears stable and appropriate for d/c home on repeat examination.  Will prescribe ceftin/azithromycin combined abx to cover for PNA concerns.   [PRATIK]      ED Course User Index  [PRATIK] Ki Ring MD         DIAGNOSIS  Final diagnoses:   Pneumonia of right lung due to infectious organism, unspecified part of lung         DISPOSITION  Discharge home        Latest Documented Vital Signs:  As of 14:52 EDT  BP- 120/79 HR- 60 Temp- 97.1 °F (36.2 °C) (Tympanic) O2 sat- 98%              --    Please note that portions of this were completed with a voice recognition program.       Note Disclaimer: At [facility], we believe that sharing information builds trust and better relationships. You are receiving this note because you are receiving care at [facility] or recently visited. It is possible you will see health information before a provider has talked with you about it. This kind of information can be easy to misunderstand. To help you fully understand what it means for your health, we urge you to discuss this note with your provider.             Ki Ring MD  09/17/23 [id number]

## 2023-09-17 NOTE — DISCHARGE INSTRUCTIONS
Take antibiotics as directed until completed.  Recommend follow-up with PCP for further evaluation.  Please return to the emergency room for any worsening pain, fevers, weakness, difficulties breathing or any other concerns.

## 2024-02-06 ENCOUNTER — TELEPHONE (OUTPATIENT)
Dept: URGENT CARE | Facility: CLINIC | Age: 44
End: 2024-02-06
Payer: COMMERCIAL

## 2024-02-06 DIAGNOSIS — J20.9 ACUTE BRONCHITIS, UNSPECIFIED ORGANISM: Primary | ICD-10-CM

## 2024-02-06 RX ORDER — CEFUROXIME AXETIL 500 MG/1
500 TABLET ORAL 2 TIMES DAILY
Qty: 14 TABLET | Refills: 0 | Status: SHIPPED | OUTPATIENT
Start: 2024-02-06

## 2024-02-06 RX ORDER — AZITHROMYCIN 250 MG/1
TABLET, FILM COATED ORAL
Qty: 6 TABLET | Refills: 0 | Status: SHIPPED | OUTPATIENT
Start: 2024-02-06

## 2024-02-06 NOTE — TELEPHONE ENCOUNTER
D/w pt.  Cough persists; familiar sx; responds well to ceftin/zpak; no new c/o.  P - ceftin # 14; zpak; o/w same.